# Patient Record
Sex: FEMALE | NOT HISPANIC OR LATINO | Employment: FULL TIME | URBAN - METROPOLITAN AREA
[De-identification: names, ages, dates, MRNs, and addresses within clinical notes are randomized per-mention and may not be internally consistent; named-entity substitution may affect disease eponyms.]

---

## 2024-04-17 ENCOUNTER — OFFICE VISIT (OUTPATIENT)
Dept: DERMATOLOGY | Facility: CLINIC | Age: 39
End: 2024-04-17
Payer: COMMERCIAL

## 2024-04-17 DIAGNOSIS — D22.9 MULTIPLE BENIGN NEVI: Primary | ICD-10-CM

## 2024-04-17 DIAGNOSIS — L81.4 SOLAR LENTIGO: ICD-10-CM

## 2024-04-17 PROCEDURE — 99203 OFFICE O/P NEW LOW 30 MIN: CPT | Performed by: DERMATOLOGY

## 2024-04-17 RX ORDER — LEVONORGESTREL 52 MG/1
1 INTRAUTERINE DEVICE INTRAUTERINE
COMMUNITY

## 2024-04-17 ASSESSMENT — PAIN SCALES - GENERAL: PAINLEVEL: NO PAIN (0)

## 2024-04-17 NOTE — PATIENT INSTRUCTIONS

## 2024-04-17 NOTE — PROGRESS NOTES
[facility] Dermatology Note  Encounter Date: Apr 17, 2024  Office Visit     Dermatology Problem List:  1. Last FBSE 4/17/24    Soc hx: works for [employer].  Fhx: adopted.  ____________________________________________    Assessment & Plan:    # Lesion to monitor, R cheek x2. Favor benign nevi.  - Will monitor clinically, photo documented today.    # Multiple benign nevi.   - Monitor for ABCDEs of melanoma   - Continue sun protection - recommend SPF 30 or higher with frequent application   - Return sooner if noticing changing or symptomatic lesions    # Benign lesions - lentigenes.  - No treatment required      Procedures Performed:   None.    Follow-up: 2 year(s) in-person, or earlier for new or changing lesions    Staff and Scribe:     Scribe Disclosure:   JAMIE CLARK am serving as a scribe; to document services personally performed by Lindsay Azul MD-based on data collection and the provider's statements to me.      Provider Disclosure:   The documentation recorded by the scribe accurately reflects the services I personally performed and the decisions made by me.    Lindsay Azul MD    Department of Dermatology  [facility]: Phone: [phone], Fax: [phone]  4/23/2024     ____________________________________________    CC: Skin Check (Patient reports no specific lesions of concern. The patient would like a FBSE. )    HPI:  Ms. Sapphire White is a(n) 38 year old female who presents today as a new patient for a FBSE. This will be her first appointment in dermatology. She was adopted, and knows no family history. Recently, has not had any sunburns. She has used tanning begs a few times. She does not wear sunscreen too often.    Today, the patient has no spots of concern.     Patient is otherwise feeling well, without additional skin concerns.    Labs Reviewed:  N/A    Physical  Exam:  Vitals: There were no vitals taken for this visit.  SKIN: Full body skin exam excluding the genitals was performed including face, scalp, neck, ears, chest, back, bilateral arms, hands, bilateral legs, feet, and buttocks.   - Multiple regular brown pigmented macules and papules are identified on the trunk and extremities.   - Scattered brown macules on sun exposed areas.  - slightly hypertrophic linear plaque on the R jaw line.   - R cheek, several homogenous dark brown black papules anteriorly measuring 4 mm and posteriorly measuring 5 mm.  - No other lesions of concern on areas examined.     Medications:  Current Outpatient Medications   Medication Sig Dispense Refill    levonorgestrel (MIRENA, 52 MG,) 52 MG (20 mcg/day) IUD 1 each by Intrauterine route       No current facility-administered medications for this visit.      Past Medical History:   There is no problem list on file for this patient.    History reviewed. No pertinent past medical history.     CC No referring provider defined for this encounter. on close of this encounter.

## 2024-04-17 NOTE — NURSING NOTE
Chief Complaint   Patient presents with    Skin Check     Patient reports no specific lesions of concern. The patient would like a FBSE.      Hilary Gallo LPN

## 2024-04-17 NOTE — LETTER
4/17/2024       RE: Sapphire White  [address]  [address]  [facility] [facility] [address]     Dear Colleague,    Thank you for referring your patient, Sapphire White, to the [facility] [place] at [facility]. Please see a copy of my visit note below.    [facility] Dermatology Note  Encounter Date: Apr 17, 2024  Office Visit     Dermatology Problem List:  1. Last FBSE 4/17/24    Soc hx: works for [employer].  Fhx: adopted.  ____________________________________________    Assessment & Plan:    # Lesion to monitor, R cheek x2. Favor benign nevi.  - Will monitor clinically, photo documented today.    # Multiple benign nevi.   - Monitor for ABCDEs of melanoma   - Continue sun protection - recommend SPF 30 or higher with frequent application   - Return sooner if noticing changing or symptomatic lesions    # Benign lesions - lentigenes.  - No treatment required      Procedures Performed:   None.    Follow-up: 2 year(s) in-person, or earlier for new or changing lesions    Staff and Scribe:     Scribe Disclosure:   I, JAMIE VOGEL, am serving as a scribe; to document services personally performed by Lindsay Azul MD-based on data collection and the provider's statements to me.      Provider Disclosure:   The documentation recorded by the scribe accurately reflects the services I personally performed and the decisions made by me.    Lindsay Azul MD    Department of Dermatology  [facility]: Phone: [phone], Fax: [phone]  4/23/2024     ____________________________________________    CC: Skin Check (Patient reports no specific lesions of concern. The patient would like a FBSE. )    HPI:  Ms. Sapphire White is a(n) 38 year old female who presents today as a new patient for a FBSE. This will be her first appointment in  dermatology. She was adopted, and knows no family history. Recently, has not had any sunburns. She has used tanning begs a few times. She does not wear sunscreen too often.    Today, the patient has no spots of concern.     Patient is otherwise feeling well, without additional skin concerns.    Labs Reviewed:  N/A    Physical Exam:  Vitals: There were no vitals taken for this visit.  SKIN: Full body skin exam excluding the genitals was performed including face, scalp, neck, ears, chest, back, bilateral arms, hands, bilateral legs, feet, and buttocks.   - Multiple regular brown pigmented macules and papules are identified on the trunk and extremities.   - Scattered brown macules on sun exposed areas.  - slightly hypertrophic linear plaque on the R jaw line.   - R cheek, several homogenous dark brown black papules anteriorly measuring 4 mm and posteriorly measuring 5 mm.  - No other lesions of concern on areas examined.     Medications:  Current Outpatient Medications   Medication Sig Dispense Refill    levonorgestrel (MIRENA, 52 MG,) 52 MG (20 mcg/day) IUD 1 each by Intrauterine route       No current facility-administered medications for this visit.      Past Medical History:   There is no problem list on file for this patient.    History reviewed. No pertinent past medical history.     CC No referring provider defined for this encounter. on close of this encounter.

## 2024-12-15 ENCOUNTER — HEALTH MAINTENANCE LETTER (OUTPATIENT)
Age: 39
End: 2024-12-15

## 2025-02-16 ENCOUNTER — HEALTH MAINTENANCE LETTER (OUTPATIENT)
Age: 40
End: 2025-02-16

## 2025-07-01 ENCOUNTER — TELEPHONE (OUTPATIENT)
Dept: DERMATOLOGY | Facility: CLINIC | Age: 40
End: 2025-07-01
Payer: COMMERCIAL

## 2025-07-01 NOTE — TELEPHONE ENCOUNTER
7/1 Left Voicemail (1st Attempt) and Sent Mychart (1st Attempt) for the patient to call back and schedule the following:    Appointment type: Return Dermatology  Provider: Latha  Return date: 04/23/2026  Specialty phone number: [phone]  Additional appointment(s) needed: na  Additonal Notes: na

## 2025-07-16 ENCOUNTER — ANCILLARY PROCEDURE (OUTPATIENT)
Dept: MAMMOGRAPHY | Facility: CLINIC | Age: 40
End: 2025-07-16
Payer: COMMERCIAL

## 2025-07-16 DIAGNOSIS — Z12.31 VISIT FOR SCREENING MAMMOGRAM: ICD-10-CM

## 2025-07-16 PROCEDURE — 77063 BREAST TOMOSYNTHESIS BI: CPT | Mod: GC

## 2025-07-16 PROCEDURE — 77067 SCR MAMMO BI INCL CAD: CPT | Mod: GC

## 2025-07-24 ENCOUNTER — ANCILLARY PROCEDURE (OUTPATIENT)
Dept: MAMMOGRAPHY | Facility: CLINIC | Age: 40
End: 2025-07-24
Attending: FAMILY MEDICINE
Payer: COMMERCIAL

## 2025-07-24 DIAGNOSIS — R92.8 ABNORMAL MAMMOGRAM OF RIGHT BREAST: ICD-10-CM

## 2025-07-24 PROCEDURE — 77065 DX MAMMO INCL CAD UNI: CPT | Mod: RT | Performed by: RADIOLOGY
